# Patient Record
(demographics unavailable — no encounter records)

---

## 2024-12-04 NOTE — ADDENDUM
[FreeTextEntry1] : Patient's hemoglobin A1c is 8.5 patient is being followed by endocrinologist patient is is on Lantus  insulin 60 units q.60 daily units

## 2024-12-04 NOTE — PHYSICAL EXAM
[Normal Conjunctiva] : normal conjunctiva [Normal S1, S2] : normal S1, S2 [No Murmur] : no murmur [Normal] : alert and oriented, normal memory [de-identified] : achellis tendon rupture

## 2024-12-04 NOTE — ASSESSMENT
[FreeTextEntry1] : Pt. is a 60-year-old F with PMHx of HLD, HTN, non-obstructive CAD (CAC score 110 (93%), mild stenoses of the pLAD and mLAD and minimal stenosis of the dRCA on CCTA in 2021) and DM (last A1c 7.5% 04/2023) who presents today for follow-up. cad no cp pt is swimming htn pt to continue Candesartan/hctz 32/25 hdl lipid profile ordered continue rosuvastatin 10mg adjust dose according to result obesity Pt will try to obtain supply pt is swimming regularly DVT/PE on eliquis 5mg po bid pt had eight heart strain elevation of troponin Pt also has gallbladder stones Pt told it needs to come out pt advised to go to er for any vomiting or fever will see Dr Ray for opinion

## 2024-12-04 NOTE — REASON FOR VISIT
[CV Risk Factors and Non-Cardiac Disease] : CV risk factors and non-cardiac disease [Hyperlipidemia] : hyperlipidemia [Hypertension] : hypertension [Coronary Artery Disease] : coronary artery disease [FreeTextEntry1] : Pt. is a 60-year-old F with PMHx of HLD, HTN, non-obstructive CAD (CAC score 110 (93%), mild stenoses of the pLAD and mLAD and minimal stenosis of the dRCA on CCTA in 2021) and DM (last A1c 7.5% 04/2023) who presents today after a hospitalizartion for dvt/pulmonary emboli pt is on Eliquis 5mg po bid pt is on Insulin lantus lilliam Arciniega is endocrinologist pt had been in a boot for achelis tendon rupture on opposite foot

## 2024-12-04 NOTE — HISTORY OF PRESENT ILLNESS
[FreeTextEntry1] : Since her last visit, pt has not been able to obtain the Monjaro on a regular basis  Activity: limited by a ruptured achilles tendon   Pt. denies any chest pain, dyspnea, orthopnea, PND, palpitations, lightheadedness, syncope or lower extremity edema.   ============================ Labs/Diagnostics: 2023 TSH: 1.67 Lipid profile: T, TC: 209, HDL: 83, LDL: 107 K/Creat: 4.2/0.73 A1c: 6.9%  Echo (2023):  CONCLUSIONS: 1. There is mild (grade 1) left ventricular diastolic dysfunction. 2. The left atrium is normal. 3. Normal mitral valve structure and function. No mitral stenosis or significant regurgitation. 4. Mild thickening of the aortic valve leaflets. 5. The right atrium is normal. 6. No pericardial effusion seen. 7. Normal right ventricular cavity size. 8. Tricuspid valve is structurally normal with no stenosis or significant regurgitation.  Stress EKG (2022): 3:06 mins exercise; 4.8 METS; equivocal.  CCTA (2021):  IMPRESSION: Cardiac: 1.  The calcium score is moderate at 119 Agatston units, which is at the 93 percentile, adjusted for age, gender and race. 2.  Normal Left Main. 3.  Mild stenoses of the proximal and mid left anterior descending artery. 4.  Minimal stenosis of the distal right coronary artery. 5.  Cannot exclude stenosis of the first diagonal branch, though it is a small caliber vessel. 6.  The remaining segments are angiographically normal. 7.  Visually estimated normal left ventricular ejection fraction.  Non-cardiac: 1. Mild cardiomegaly.

## 2024-12-04 NOTE — PHYSICAL EXAM
[Normal Conjunctiva] : normal conjunctiva [Normal S1, S2] : normal S1, S2 [No Murmur] : no murmur [Normal] : alert and oriented, normal memory [de-identified] : achellis tendon rupture

## 2024-12-09 NOTE — RESULTS/DATA
[FreeTextEntry1] : Date: 10/24/24 Study: MRI Cholangio WO Results: Distended gallbladder with numerous gallstones, including stones noted within the gallbladder neck. No glalbladder wall thickening or pericholecystic fluid No CBD stricture or choledocholithiasis

## 2024-12-09 NOTE — REVIEW OF SYSTEMS
[Negative] : Heme/Lymph [FreeTextEntry8] : Patient complains of R mid abdomen tenderness when palpated

## 2024-12-09 NOTE — HISTORY OF PRESENT ILLNESS
[de-identified] : Patient Name: HERVE COLLAZO  MRN: [id number]  Referring Provider: FARZAD HALL MD  Date: 12/09/2024   Diagnosis: Gallstones  61 year old female presents for evaluation of gallstones seen on MRI done 10/24/24. PMH: Colon cancer, and celiacs 7 years ago. s/p colon resection with no recurrence of disease. Last colonoscopy 1/17/2024  10/19/2024 - Patient collapsed and was rushed to [facility].  patient was found to be in DKA and hypovolemic and was treated in the ICU. A RLE DVT was found on U/S as well as B/L PEs 10/22/2024 - Abdominal US showing distended gallbladder containing multiple shadowing gallstones. Mild gallbladder thickening. No collier's sign. No intrahepatic ductal dilation. CBD 0.65cm 10/22/2024 - MRI/MRCP abdomen showing CBD measuring 0.7cm. No stricture or cholelithiasis. Distended gallbladder with numerous gallstones including gallstones in the gallbladder neck 10/24/2024 - RLE thrombectomy  Repeat MRI 10/24/2024 unchanged  10/24/2024   Of note patient states she was taking Mounjaro for about 2 months and stopped being able to obtain from local pharmacy since July 2024.   Currently, Ms. VALE VALENCIA has been experiencing irretraceable nausea, without vomiting. Complains of abdominal and pelvic pain intermittently that resolves after she has a BM. States she is having decreased appetite but is tolerating her regular diet. Denies diarrhea/constipation.  She denies recent unintentional weight loss. Patient states she has gained 15 lbs since she started taking Lantus. She denies fevers, chills, or night sweats.   Functional Status: Ms. VALE VALENCIA is able to ambulate and climb stairs without fatigue or dyspnea.

## 2024-12-09 NOTE — PHYSICAL EXAM
[Normal] : oriented to person, place and time, with appropriate affect [de-identified] : Regular heart rate and rhythm S1, S2 heard [de-identified] : No SOB noted [de-identified] : Abdomen soft, non-distended. Tender to palpation on R side

## 2024-12-09 NOTE — PHYSICAL EXAM
[Normal] : oriented to person, place and time, with appropriate affect [de-identified] : Regular heart rate and rhythm S1, S2 heard [de-identified] : No SOB noted [de-identified] : Abdomen soft, non-distended. Tender to palpation on R side

## 2024-12-09 NOTE — HISTORY OF PRESENT ILLNESS
[de-identified] : Patient Name: HERVE COLLAZO  MRN: [id number]  Referring Provider: FARZAD HALL MD  Date: 12/09/2024   Diagnosis: Gallstones  61 year old female presents for evaluation of gallstones seen on MRI done 10/24/24. PMH: Colon cancer, and celiacs 7 years ago. s/p colon resection with no recurrence of disease. Last colonoscopy 1/17/2024  10/19/2024 - Patient collapsed and was rushed to [facility].  patient was found to be in DKA and hypovolemic and was treated in the ICU. A RLE DVT was found on U/S as well as B/L PEs 10/22/2024 - Abdominal US showing distended gallbladder containing multiple shadowing gallstones. Mild gallbladder thickening. No collier's sign. No intrahepatic ductal dilation. CBD 0.65cm 10/22/2024 - MRI/MRCP abdomen showing CBD measuring 0.7cm. No stricture or cholelithiasis. Distended gallbladder with numerous gallstones including gallstones in the gallbladder neck 10/24/2024 - RLE thrombectomy  Repeat MRI 10/24/2024 unchanged  10/24/2024   Of note patient states she was taking Mounjaro for about 2 months and stopped being able to obtain from local pharmacy since July 2024.   Currently, Ms. VALE VALENCIA has been experiencing irretraceable nausea, without vomiting. Complains of abdominal and pelvic pain intermittently that resolves after she has a BM. States she is having decreased appetite but is tolerating her regular diet. Denies diarrhea/constipation.  She denies recent unintentional weight loss. Patient states she has gained 15 lbs since she started taking Lantus. She denies fevers, chills, or night sweats.   Functional Status: Ms. VALE VALENCIA is able to ambulate and climb stairs without fatigue or dyspnea.

## 2024-12-09 NOTE — HISTORY OF PRESENT ILLNESS
[de-identified] : Patient Name: HERVE COLLAZO  MRN: [id number]  Referring Provider: FARZAD HALL MD  Date: 12/09/2024   Diagnosis: Gallstones  61 year old female presents for evaluation of gallstones seen on MRI done 10/24/24. PMH: Colon cancer, and celiacs 7 years ago. s/p colon resection with no recurrence of disease. Last colonoscopy 1/17/2024  10/19/2024 - Patient collapsed and was rushed to [facility].  patient was found to be in DKA and hypovolemic and was treated in the ICU. A RLE DVT was found on U/S as well as B/L PEs 10/22/2024 - Abdominal US showing distended gallbladder containing multiple shadowing gallstones. Mild gallbladder thickening. No collier's sign. No intrahepatic ductal dilation. CBD 0.65cm 10/22/2024 - MRI/MRCP abdomen showing CBD measuring 0.7cm. No stricture or cholelithiasis. Distended gallbladder with numerous gallstones including gallstones in the gallbladder neck 10/24/2024 - RLE thrombectomy  Repeat MRI 10/24/2024 unchanged  10/24/2024   Of note patient states she was taking Mounjaro for about 2 months and stopped being able to obtain from local pharmacy since July 2024.   Currently, Ms. VALE VALENCIA has been experiencing irretraceable nausea, without vomiting. Complains of abdominal and pelvic pain intermittently that resolves after she has a BM. States she is having decreased appetite but is tolerating her regular diet. Denies diarrhea/constipation.  She denies recent unintentional weight loss. Patient states she has gained 15 lbs since she started taking Lantus. She denies fevers, chills, or night sweats.   Functional Status: Ms. VALE VALENCIA is able to ambulate and climb stairs without fatigue or dyspnea.

## 2024-12-09 NOTE — PHYSICAL EXAM
[Normal] : oriented to person, place and time, with appropriate affect [de-identified] : Regular heart rate and rhythm S1, S2 heard [de-identified] : No SOB noted [de-identified] : Abdomen soft, non-distended. Tender to palpation on R side

## 2025-01-02 NOTE — HISTORY OF PRESENT ILLNESS
[de-identified] : Patient Name: HERVE COLLAZO MRN: [id number] Referring Provider: FARZAD HALL MD Date: 1/6/2025  Diagnosis: Gallstones  61 year old female presents today to follow up on symptoms of gallstones and evaluate if surgical intervention is necessary.  PMH: Colon cancer, and celiacs 7 years ago. s/p colon resection with no recurrence of disease. Last colonoscopy 1/17/2024  10/19/2024 - Patient collapsed and was rushed to [facility]. patient was found to be in DKA and hypovolemic and was treated in the ICU. A RLE DVT was found on U/S as well as B/L PEs 10/22/2024 - Abdominal US showing distended gallbladder containing multiple shadowing gallstones. Mild gallbladder thickening. No collier's sign. No intrahepatic ductal dilation. CBD 0.65cm 10/22/2024 - MRI/MRCP abdomen showing CBD measuring 0.7cm. No stricture or cholelithiasis. Distended gallbladder with numerous gallstones including gallstones in the gallbladder neck 10/24/2024 - RLE thrombectomy, repeat MRI unchanged Patient seen on 12/9/2024 and decision was made to hold off on any surgical intervention due risk benefit of stopping systemic anticoagulation for PE

## 2025-01-02 NOTE — HISTORY OF PRESENT ILLNESS
[de-identified] : Patient Name: HERVE COLLAZO MRN: [id number] Referring Provider: FARZAD HALL MD Date: 1/6/2025  Diagnosis: Gallstones  61 year old female presents today to follow up on symptoms of gallstones and evaluate if surgical intervention is necessary.  PMH: Colon cancer, and celiacs 7 years ago. s/p colon resection with no recurrence of disease. Last colonoscopy 1/17/2024  10/19/2024 - Patient collapsed and was rushed to [facility]. patient was found to be in DKA and hypovolemic and was treated in the ICU. A RLE DVT was found on U/S as well as B/L PEs 10/22/2024 - Abdominal US showing distended gallbladder containing multiple shadowing gallstones. Mild gallbladder thickening. No collier's sign. No intrahepatic ductal dilation. CBD 0.65cm 10/22/2024 - MRI/MRCP abdomen showing CBD measuring 0.7cm. No stricture or cholelithiasis. Distended gallbladder with numerous gallstones including gallstones in the gallbladder neck 10/24/2024 - RLE thrombectomy, repeat MRI unchanged Patient seen on 12/9/2024 and decision was made to hold off on any surgical intervention due risk benefit of stopping systemic anticoagulation for PE

## 2025-01-02 NOTE — HISTORY OF PRESENT ILLNESS
[de-identified] : Patient Name: HERVE COLLAZO MRN: [id number] Referring Provider: FARZAD HALL MD Date: 1/6/2025  Diagnosis: Gallstones  61 year old female presents today to follow up on symptoms of gallstones and evaluate if surgical intervention is necessary.  PMH: Colon cancer, and celiacs 7 years ago. s/p colon resection with no recurrence of disease. Last colonoscopy 1/17/2024  10/19/2024 - Patient collapsed and was rushed to [facility]. patient was found to be in DKA and hypovolemic and was treated in the ICU. A RLE DVT was found on U/S as well as B/L PEs 10/22/2024 - Abdominal US showing distended gallbladder containing multiple shadowing gallstones. Mild gallbladder thickening. No collier's sign. No intrahepatic ductal dilation. CBD 0.65cm 10/22/2024 - MRI/MRCP abdomen showing CBD measuring 0.7cm. No stricture or cholelithiasis. Distended gallbladder with numerous gallstones including gallstones in the gallbladder neck 10/24/2024 - RLE thrombectomy, repeat MRI unchanged Patient seen on 12/9/2024 and decision was made to hold off on any surgical intervention due risk benefit of stopping systemic anticoagulation for PE

## 2025-01-16 NOTE — HISTORY OF PRESENT ILLNESS
[FreeTextEntry1] : 61-year-old F with PMHx of HLD, HTN, non-obstructive CAD (CAC score 110 (93%), mild stenoses of the pLAD and mLAD and minimal stenosis of the dRCA on CCTA in 2021) and DM (last A1c 7.5% 04/2023)  after a hospitalizartion for dvt/pulmonary emboli pt is on Eliquis 5mg po bid pt is on Insulin lantus qam Graham Arciniega is endocrinologist pt had been in a boot for achelis tendon rupture on opposite foot patient was found to have gallstones and has persistent nausea patient was evaluated  by Dr. Roddy Ray patient for lifestyle reasons will undergo a cholecystectomy there is no sign of acute inflammation patient is baseline nauseous Zofran did not help may have even made it worse  Since her last visit, pt on Ozempic  Activity: limited by a ruptured achilles tendon   Pt. denies any chest pain, dyspnea, orthopnea, PND, palpitations, lightheadedness, syncope or lower extremity edema.  ============================ Labs (12/2024): H/h 13.3/43.2 A1c 8.5%  HDL 88 LDL  112 K 4.7 Cr 0.78  Echo (6/2024):  1. Left ventricular cavity is normal in size. Left ventricular wall thickness is normal. Left ventricular systolic function is normal with an ejection fraction of 66 % by Luong's method of disks. There are no regional wall motion abnormalities seen. 2. Normal left ventricular diastolic function. 3. Normal right ventricular cavity size, with normal wall thickness, and normal systolic function. 4. No significant valvular disease. 5. No pericardial effusion seen.  Stress EKG (12/2022): 3:06 mins exercise; 4.8 METS; equivocal.  CCTA (12/2021):  IMPRESSION: Cardiac: 1.  The calcium score is moderate at 119 Agatston units, which is at the 93 percentile, adjusted for age, gender and race. 2.  Normal Left Main. 3.  Mild stenoses of the proximal and mid left anterior descending artery. 4.  Minimal stenosis of the distal right coronary artery. 5.  Cannot exclude stenosis of the first diagonal branch, though it is a small caliber vessel. 6.  The remaining segments are angiographically normal. 7.  Visually estimated normal left ventricular ejection fraction. Non-cardiac: 1. Mild cardiomegaly.

## 2025-01-16 NOTE — ASSESSMENT
[FreeTextEntry1] : Pt. is a 60-year-old F with PMHx of HLD, HTN, non-obstructive CAD (CAC score 110 (93%), mild stenoses of the pLAD and mLAD and minimal stenosis of the dRCA on CCTA in 2021) and DM (last A1c 7.5% 04/2023) who presents today for preoperative clearance for gallbladder surgery cad no cp patient will have a nuclear stress test for preoperative clearance htn pt to continue Candesartan/hctz 32/25 hdl lipid profile ordered continue rosuvastatin 10mg adjust dose according to result obesity Pt has been started on Ozempic DVT/PE on eliquis 5mg po bid pt had right heart strain elevation of troponin Pt also has gallbladder stones Pt told it needs to come out pt advised to go to er for any vomiting or fever  Patient is at moderate high risk for surgery as she had a large pulmonary embolus with right heart strain and elevated troponin this was from a DVT in her leg due to immobility from a boot patient has been on anticoagulation for 3 months will have a hematology second opinion with  as patient will need to be off anticoagulation for several days pre and post surgery  Patient has mild coronary artery disease and is unable to exercise on a treadmill we will send for a nuclear stress test after checking that adenosine is not contraindicated in gallbladder disease

## 2025-01-16 NOTE — PHYSICAL EXAM
[Normal Conjunctiva] : normal conjunctiva [Normal S1, S2] : normal S1, S2 [No Murmur] : no murmur [Normal] : alert and oriented, normal memory [de-identified] : achellis tendon rupture

## 2025-01-28 NOTE — REASON FOR VISIT
[Initial Consultation] : an initial consultation for [FreeTextEntry2] : History of recurrent DVT, history of pulmonary embolism, rule out hypercoagulable state

## 2025-01-28 NOTE — PHYSICAL EXAM
[Obese] : obese [Normal] : affect appropriate [de-identified] : Trace LE edema [de-identified] : Normal bowel sounds,soft, tender inthe RUQ to mid abdomen on palpation [de-identified] : no ecchymoses or petechiae

## 2025-01-28 NOTE — HISTORY OF PRESENT ILLNESS
[de-identified] : Patient is a 61 year old female with a history of hyperlipidemia, hypertension, diabetes, non-obstructive coronary artery disease, history of DVT in the distant past and hospitalization in October 2024 for another DVT and pulmonary embolism who is referred for evaluation for hypercoagulable state and perioperative management for gallbladder surgery in the near future. Patient states that 40 years ago she had an injury to the left lower extremity (fractured left kneecap) and was immobilized for 8 weeks with subsequent deep vein thrombosis treated at the time with Warfarin for 1 year. In June of 2023, the patient sustained an injury to her achilles tendon in the lower LEFT extremity and was immobilized for some time. She is still undergoing physical therapy. In October of 2024, patient felt discomfort in the RIGHT lower extremity when getting out of a car and was found later to have a right lower extremity DVT complicated by pulmonary embolism. She was treated initial with IV heparin and discharged on Eliquis. She denies bruising easily/excessively while on the Eliquis. Patient states that she has abdominal discomfort and constant nausea and is in need of this cholecystectomy. There is a family history of brain aneurysm (three maternal aunts) and breast cancer but there is no family thrombotic events according to the patient. Patient states she has a period sporadically but does not bleed excessively. She denies blood in the urine, stool or from nose or mouth. Patient states that she has gained 20 pounds. Patient states she had laryngitis last week which has since resolved. Today, the patient feels fatigued and complains of lower extremity edema, joint pain in the legs, nausea and dizziness. Denies fever, chills, drenching night sweats, bruising easily/excessively, chest pain, palpitations, tinnitus, shortness of breath or unintentional weight loss.

## 2025-01-28 NOTE — REVIEW OF SYSTEMS
[Fatigue] : fatigue [Recent Change In Weight] : ~T recent weight change [Lower Ext Edema] : lower extremity edema [Abdominal Pain] : abdominal pain [Dizziness] : dizziness [Negative] : Allergic/Immunologic [Fever] : no fever [Chills] : no chills [Night Sweats] : no night sweats [Eye Pain] : no eye pain [Vision Problems] : no vision problems [Loss of Hearing] : no loss of hearing [Chest Pain] : no chest pain [SOB on Exertion] : no shortness of breath during exertion [Dysuria] : no dysuria [Skin Rash] : no skin rash [Muscle Weakness] : no muscle weakness [Easy Bleeding] : no tendency for easy bleeding [Easy Bruising] : no tendency for easy bruising [FreeTextEntry2] : 20 pound weight gain [FreeTextEntry4] : larygitis last week [FreeTextEntry7] : nausea attributed to gall bladder disease [FreeTextEntry8] : Has a period sporadically

## 2025-01-28 NOTE — ASSESSMENT
[FreeTextEntry1] : Patient is a 61 year old female with a history of hyperlipidemia, hypertension, diabetes, non-obstructive coronary artery disease, history of DVT in the distant past and hospitalization in October 2024 for another DVT and pulmonary embolism who is referred for evaluation for possible hypercoagulable state and perioperative management for gallbladder surgery in the near future. Have ordered Activated Partial Thromboplastin Time, CBC with auto differential, CMP, manual differential, prothrombin time and INR, reticulocyte count, sed rate, Antithrombin III Assay, Beta-2 Glycoprotein 1 Antibodies, IgG, IgM and IgA, Cardiolipin Antibodies, IgG, IgM, and IgA, Diluted Charlie's Viper Venom Time, Factor V Leiden, Hexagonal Phase Lupus Assay, Protein C Activity/Antigen with Reflex, Protein S Antigen Assay, Protein S Free Activity Assay and Prothrombin gene mutation. Venipuncture was performed in the office today. Patient was advised to call the office to discuss results and further recommendations.

## 2025-03-11 NOTE — PHYSICAL EXAM
[Normal] : grossly intact [de-identified] : anicteric [de-identified] : S1,S2, regular rate and rhythm. No murmurs heard. [de-identified] : Clear throughout. No wheezes heard. [de-identified] : right sided tenderness to palpation

## 2025-03-11 NOTE — HISTORY OF PRESENT ILLNESS
[de-identified] : Patient Name: HERVE COLLAZO MRN: [id number] Referring Provider: FARZAD HALL MD Date: 3/11/2025  Diagnosis: Gallstones  61 year old female presents today to follow up on symptoms of gallstones and evaluate if surgical intervention is necessary.  PMH: Colon cancer, and celiacs 7 years ago. s/p colon resection with no recurrence of disease. Last colonoscopy 1/17/2024  10/19/2024 - Patient collapsed and was rushed to [facility]. patient was found to be in DKA and hypovolemic and was treated in the ICU. A RLE DVT was found on U/S as well as B/L PEs 10/22/2024 - Abdominal US showing distended gallbladder containing multiple shadowing gallstones. Mild gallbladder thickening. No collier's sign. No intrahepatic ductal dilation. CBD 0.65cm 10/22/2024 - MRI/MRCP abdomen showing CBD measuring 0.7cm. No stricture or cholelithiasis. Distended gallbladder with numerous gallstones including gallstones in the gallbladder neck 10/24/2024 - RLE thrombectomy, repeat MRI unchanged Patient seen on 12/9/2024 and decision was made to hold off on any surgical intervention due risk benefit of stopping systemic anticoagulation for PE 1/6/25 - Venous duplex negative for DVT 1/28/25 - aPTT 44.1, PT/INR WDL She recently saw her cardiologist and hematologist and had a stress test. She was advised she can stop Eliquis 48 hours prior to gallbladder surgery.   Currently, Ms. COLLAZO continues to have episodes of right sided abdominal pain, at times radiating up her arm. She also has nausea that is unrelieved with the use of Zofran. She tolerates a regular diet but she has a poor appetite.

## 2025-03-11 NOTE — ASSESSMENT
[FreeTextEntry1] : Impression) gallstones  PLAN: Again I discussed with the patient that based on Her  history, especially the fact that some of the attacks exacerbated with fatty foods, I do think this is related to her gallbladder. Treatment of choice would be a laparoscopic cholecystectomy. We discussed the nature of the operation, risks, and benefits, which include, but not limited to postoperative bleeding and infection as well as possibility of biliary tract injury.  We also reviewed her pulmonary consultations regarding her history of DVT.  The feeling is that her DVTs in the past were provoked and at this point it safe for her to stop the Eliquis for a couple of days prior to surgery and then use standard perioperative prophylaxis before resuming Eliquis a few days after surgery.  There is no need for an IVC filter for this kind of surgery.  Questions have been answered. Arrangements will be made to proceed as outlined.  Rhys Ray MD  Chief Surgical Oncology Multidisciplinary GI cancer program [facility]  Professor Surgery [facility]  CC Dr. Herzog, Dr. Harman  Time spent in consultation 25 minutes

## 2025-04-01 NOTE — ASSESSMENT
[FreeTextEntry1] : I) s/p lap choley , normal postop  P) Reviewed pathology. Advised lo fat diet for next couple of weeks. Can see us as needed.   Rhys Ray MD  Chief Surgical Oncology Multidisciplinary GI cancer program [facility]  Professor Surgery [facility] [employer] [facility] [employer]   CC Dr. Herzog

## 2025-04-01 NOTE — PHYSICAL EXAM
[de-identified] :  Abdomen soft, nontender, non-distended. Surgical incisions clean, dry and intact. Free from redness, swelling or abnormal drainage

## 2025-04-02 NOTE — ASSESSMENT
[FreeTextEntry1] : Pt. is a 60-year-old F with PMHx of HLD, HTN, non-obstructive CAD (CAC score 110 (93%), mild stenoses of the pLAD and mLAD and minimal stenosis of the dRCA on CCTA in 2021) and DM (last A1c 7.5% 04/2023)  cad no cp patient negaive nuclear ett htn pt to continue Candesartan/hctz 32/25 hdl lipid profile ordered continue rosuvastatin 10mg adjust dose according to result obesity Pt has been started on Ozempic DVT/PE on eliquis 5mg po bid pt had right heart strain elevation of troponin   Patient doing well post surgery no more nausea f/u hematology for dose of eliquis longterm

## 2025-04-02 NOTE — PHYSICAL EXAM
[Normal Conjunctiva] : normal conjunctiva [Normal S1, S2] : normal S1, S2 [No Murmur] : no murmur [Normal] : alert and oriented, normal memory [de-identified] : achellis tendon rupture

## 2025-04-02 NOTE — HISTORY OF PRESENT ILLNESS
[FreeTextEntry1] : 61-year-old F with PMHx of HLD, HTN, non-obstructive CAD (CAC score 110 (93%), mild stenoses of the pLAD and mLAD and minimal stenosis of the dRCA on CCTA in 2021) and DM (last A1c 7.5% 04/2023)  after a hospitalizartion for dvt/pulmonary emboli pt is on Eliquis 5mg po bid pt is on Insulin lantus qam Graham Arciniega is endocrinologist pt had been in a boot for achelis tendon rupture on opposite foot patient was found to have gallstones now s/p cholecystectomy  Since her last visit, pt on Ozempic  Activity: limited by a ruptured achilles tendon   Pt. denies any chest pain, dyspnea, orthopnea, PND, palpitations, lightheadedness, syncope or lower extremity edema.  ============================ Labs (12/2024): H/h 13.3/43.2 A1c 8.5%  HDL 88 LDL  112 K 4.7 Cr 0.78  Echo (6/2024):  1. Left ventricular cavity is normal in size. Left ventricular wall thickness is normal. Left ventricular systolic function is normal with an ejection fraction of 66 % by Luong's method of disks. There are no regional wall motion abnormalities seen. 2. Normal left ventricular diastolic function. 3. Normal right ventricular cavity size, with normal wall thickness, and normal systolic function. 4. No significant valvular disease. 5. No pericardial effusion seen.  Stress EKG (12/2022): 3:06 mins exercise; 4.8 METS; equivocal.  CCTA (12/2021):  IMPRESSION: Cardiac: 1.  The calcium score is moderate at 119 Agatston units, which is at the 93 percentile, adjusted for age, gender and race. 2.  Normal Left Main. 3.  Mild stenoses of the proximal and mid left anterior descending artery. 4.  Minimal stenosis of the distal right coronary artery. 5.  Cannot exclude stenosis of the first diagonal branch, though it is a small caliber vessel. 6.  The remaining segments are angiographically normal. 7.  Visually estimated normal left ventricular ejection fraction. Non-cardiac: 1. Mild cardiomegaly.

## 2025-05-14 NOTE — HISTORY OF PRESENT ILLNESS
[FreeTextEntry1] : 61-year-old F with PMHx of HLD, HTN, non-obstructive CAD (CAC score 110 (93%), mild stenoses of the pLAD and mLAD and minimal stenosis of the dRCA on CCTA in 2021) and DM (last A1c 7.5% 04/2023)  after a hospitalizartion for dvt/pulmonary emboli pt is on Eliquis 5mg po bid pt is on Insulin lantus qam Graham Arciniega is endocrinologist pt had been in a boot for achelis tendon rupture on opposite foot patient was found to have gallstones now s/p cholecystectomy  Since her last visit, pt on Ozempic Lantus rosuvastatin and amolodipine  Activity: limited by a ruptured achilles tendon   Pt. denies any chest pain, dyspnea, orthopnea, PND, palpitations, lightheadedness, syncope or lower extremity edema.  ============================ Labs (12/2024): H/h 13.3/43.2 A1c 8.5%  HDL 88 LDL  112 K 4.7 Cr 0.78  Echo (6/2024):  1. Left ventricular cavity is normal in size. Left ventricular wall thickness is normal. Left ventricular systolic function is normal with an ejection fraction of 66 % by Luong's method of disks. There are no regional wall motion abnormalities seen. 2. Normal left ventricular diastolic function. 3. Normal right ventricular cavity size, with normal wall thickness, and normal systolic function. 4. No significant valvular disease. 5. No pericardial effusion seen.  Stress EKG (12/2022): 3:06 mins exercise; 4.8 METS; equivocal.  CCTA (12/2021):  IMPRESSION: Cardiac: 1.  The calcium score is moderate at 119 Agatston units, which is at the 93 percentile, adjusted for age, gender and race. 2.  Normal Left Main. 3.  Mild stenoses of the proximal and mid left anterior descending artery. 4.  Minimal stenosis of the distal right coronary artery. 5.  Cannot exclude stenosis of the first diagonal branch, though it is a small caliber vessel. 6.  The remaining segments are angiographically normal. 7.  Visually estimated normal left ventricular ejection fraction. Non-cardiac: 1. Mild cardiomegaly.

## 2025-05-14 NOTE — REVIEW OF SYSTEMS
None [Fever] : no fever [Chills] : no chills [SOB] : no shortness of breath [Dyspnea on exertion] : not dyspnea during exertion [Chest Discomfort] : no chest discomfort [Lower Ext Edema] : no extremity edema [Leg Claudication] : no intermittent leg claudication [Palpitations] : no palpitations [Orthopnea] : no orthopnea [PND] : no PND [Syncope] : no syncope [Cough] : no cough [Dizziness] : no dizziness

## 2025-05-14 NOTE — PHYSICAL EXAM
[Normal Conjunctiva] : normal conjunctiva [Normal S1, S2] : normal S1, S2 [No Murmur] : no murmur [Normal] : alert and oriented, normal memory [de-identified] : achellis tendon rupture

## 2025-05-14 NOTE — ASSESSMENT
[FreeTextEntry1] : Pt. is a 60-year-old F with PMHx of HLD, HTN, non-obstructive CAD (CAC score 110 (93%), mild stenoses of the pLAD and mLAD and minimal stenosis of the dRCA on CCTA in 2021) and DM (last A1c 7.5% 04/2023)  cad no cp patient negaive nuclear ett htn pt to continue amlodipine with furthur weight loss may taper hdl lipid profile ordered continue rosuvastatin 10mg adjust dose according to result obesity Pt has been started on Ozempic DVT/PE on eliquis 5mg po bid pt had right heart strain elevation of troponin   Patient doing well post surgery no more nausea f/u hematology for dose of eliquis longterm